# Patient Record
Sex: FEMALE | Race: WHITE | NOT HISPANIC OR LATINO | URBAN - METROPOLITAN AREA
[De-identification: names, ages, dates, MRNs, and addresses within clinical notes are randomized per-mention and may not be internally consistent; named-entity substitution may affect disease eponyms.]

---

## 2022-07-09 ENCOUNTER — TELEPHONE ENCOUNTER (OUTPATIENT)
Dept: URBAN - METROPOLITAN AREA CLINIC 121 | Facility: CLINIC | Age: 77
End: 2022-07-09

## 2022-07-09 RX ORDER — LEFLUNOMIDE 20 MG/1
TABLET, FILM COATED ORAL
Refills: 0 | OUTPATIENT
Start: 2014-05-13 | End: 2014-05-20

## 2022-07-09 RX ORDER — AMITRIPTYLINE HYDROCHLORIDE 10 MG/1
TABLET, FILM COATED ORAL
Refills: 0 | OUTPATIENT
Start: 2014-05-13 | End: 2014-05-20

## 2022-07-09 RX ORDER — TRAMADOL HYDROCHLORIDE 50 MG/1
TABLET ORAL
Refills: 0 | OUTPATIENT
Start: 2014-05-13 | End: 2014-05-20

## 2022-07-09 RX ORDER — TIZANIDINE 4 MG/1
TABLET ORAL
Refills: 0 | OUTPATIENT
Start: 2014-05-14 | End: 2014-05-20

## 2022-07-09 RX ORDER — OMEGA-3S/DHA/EPA/FISH OIL 980-1400MG
CAPSULE,DELAYED RELEASE (ENTERIC COATED) ORAL
Refills: 0 | OUTPATIENT
Start: 2014-05-20 | End: 2014-12-09

## 2022-07-10 ENCOUNTER — TELEPHONE ENCOUNTER (OUTPATIENT)
Dept: URBAN - METROPOLITAN AREA CLINIC 121 | Facility: CLINIC | Age: 77
End: 2022-07-10

## 2022-07-10 RX ORDER — FLAXSEED OIL 1000 MG
CAPSULE ORAL
Refills: 0 | Status: ACTIVE | COMMUNITY
Start: 2014-05-20

## 2022-07-10 RX ORDER — DEXTROMETHORPHAN POLISTIREX 30 MG/5 ML
SUSPENSION, EXTENDED RELEASE 12 HR ORAL
Refills: 0 | Status: ACTIVE | COMMUNITY
Start: 2014-05-20

## 2022-07-10 RX ORDER — HYDROXYCHLOROQUINE SULFATE 200 MG/1
TABLET, FILM COATED ORAL ONCE A DAY
Refills: 0 | Status: ACTIVE | COMMUNITY
Start: 2014-06-17

## 2022-07-10 RX ORDER — AMITRIPTYLINE HYDROCHLORIDE 10 MG/1
TABLET, FILM COATED ORAL
Refills: 0 | Status: ACTIVE | COMMUNITY
Start: 2014-05-20

## 2022-07-10 RX ORDER — TRAMADOL HYDROCHLORIDE 50 MG/1
TABLET ORAL
Refills: 0 | Status: ACTIVE | COMMUNITY
Start: 2014-05-20

## 2022-07-10 RX ORDER — LEFLUNOMIDE 20 MG/1
TABLET, FILM COATED ORAL
Refills: 0 | Status: ACTIVE | COMMUNITY
Start: 2014-05-20

## 2022-07-10 RX ORDER — SACCHAROMYCES BOULARDII 250 MG
CAPSULE ORAL ONCE A DAY
Refills: 0 | Status: ACTIVE | COMMUNITY
Start: 2014-05-20

## 2022-07-10 RX ORDER — TIZANIDINE 4 MG/1
TABLET ORAL
Refills: 0 | Status: ACTIVE | COMMUNITY
Start: 2014-05-20

## 2023-01-10 ENCOUNTER — WEB ENCOUNTER (OUTPATIENT)
Dept: URBAN - METROPOLITAN AREA CLINIC 60 | Facility: CLINIC | Age: 78
End: 2023-01-10

## 2023-01-10 ENCOUNTER — LAB OUTSIDE AN ENCOUNTER (OUTPATIENT)
Dept: URBAN - METROPOLITAN AREA CLINIC 60 | Facility: CLINIC | Age: 78
End: 2023-01-10

## 2023-01-10 ENCOUNTER — OFFICE VISIT (OUTPATIENT)
Dept: URBAN - METROPOLITAN AREA CLINIC 60 | Facility: CLINIC | Age: 78
End: 2023-01-10
Payer: MEDICARE

## 2023-01-10 VITALS
BODY MASS INDEX: 41.82 KG/M2 | TEMPERATURE: 98 F | SYSTOLIC BLOOD PRESSURE: 128 MMHG | OXYGEN SATURATION: 96 % | WEIGHT: 251 LBS | HEIGHT: 65 IN | HEART RATE: 72 BPM | DIASTOLIC BLOOD PRESSURE: 68 MMHG

## 2023-01-10 DIAGNOSIS — D75.89 MACROCYTOSIS WITHOUT ANEMIA: ICD-10-CM

## 2023-01-10 DIAGNOSIS — Z12.11 SCREEN FOR COLON CANCER: ICD-10-CM

## 2023-01-10 DIAGNOSIS — K92.2 GI BLEED: ICD-10-CM

## 2023-01-10 DIAGNOSIS — D72.829 LEUKOCYTOSIS, UNSPECIFIED: ICD-10-CM

## 2023-01-10 DIAGNOSIS — R13.10 DYSPHAGIA: ICD-10-CM

## 2023-01-10 DIAGNOSIS — D47.3 THROMBOCYTHEMIA: ICD-10-CM

## 2023-01-10 DIAGNOSIS — R10.32 LLQ ABDOMINAL PAIN: ICD-10-CM

## 2023-01-10 PROCEDURE — 99205 OFFICE O/P NEW HI 60 MIN: CPT | Performed by: INTERNAL MEDICINE

## 2023-01-10 RX ORDER — ENALAPRIL MALEATE 5 MG/1
TAKE ONE TABLET BY MOUTH ONE TIME DAILY TABLET ORAL
Qty: 90 UNSPECIFIED | Refills: 0 | Status: ACTIVE | COMMUNITY

## 2023-01-10 RX ORDER — HYDROXYUREA 500 MG/1
CAPSULE ORAL
Qty: 73 EACH | Refills: 0 | Status: ACTIVE | COMMUNITY

## 2023-01-10 RX ORDER — GABAPENTIN 600 MG/1
TAKE ONE TABLET BY MOUTH THREE TIMES A DAY TABLET, FILM COATED ORAL
Qty: 90 UNSPECIFIED | Refills: 1 | Status: ACTIVE | COMMUNITY

## 2023-01-10 RX ORDER — TIZANIDINE 4 MG/1
TABLET ORAL
Refills: 0 | Status: ACTIVE | COMMUNITY
Start: 2014-05-20

## 2023-01-10 RX ORDER — TRAMADOL HYDROCHLORIDE 50 MG/1
TABLET ORAL
Refills: 0 | Status: ACTIVE | COMMUNITY
Start: 2014-05-20

## 2023-01-10 RX ORDER — DEXTROMETHORPHAN POLISTIREX 30 MG/5 ML
SUSPENSION, EXTENDED RELEASE 12 HR ORAL
Refills: 0 | Status: ACTIVE | COMMUNITY
Start: 2014-05-20

## 2023-01-10 RX ORDER — WARFARIN SODIUM 7.5 MG/1
TABLET ORAL
Qty: 20 EACH | Refills: 0 | Status: ACTIVE | COMMUNITY

## 2023-01-10 RX ORDER — AMITRIPTYLINE HYDROCHLORIDE 10 MG/1
TABLET, FILM COATED ORAL
Refills: 0 | Status: ACTIVE | COMMUNITY
Start: 2014-05-20

## 2023-01-10 NOTE — HPI-TODAY'S VISIT:
Kaylee is a pleasant 77-year-old white female who is being seen in the office after being recently admitted to the hospital for abdominal pain and bleeding. 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 Reviewed her records extensively on [facility].  She was last seen in the office in 2014.  She underwent a colonoscopy with Dr. Johnson for chronic diarrhea and got diagnosed with collagenous colitis.  She was at the time briefly placed on budesonide.  She was referred to me after this diagnosis was made.  She was scheduled to undergo an upper endoscopy in 2014 for symptoms of dysphagia but she canceled Admitted to the hospital with lower abdominal pain and rectal bleeding.  Mesenteric ischemia was suspected and the CT angiogram was obtained which noted patent mesenteric vessels.  Evaluated by GI who felt that the bleeding was likely secondary to hemorrhoids. Hemoglobin remained stable.  She did not require transfusions.  Labs noted leukocytosis, thrombocytosis and macrocytosis.  She sees Dr. Gibbons for thrombocythemia and is on hydroxyurea. At this time she reports her symptoms have significantly improved.  Her left lower quadrant pain still persists.  Imaging studies did not reveal any diverticulitis.  Bleeding has subsided.  Reports intermittent nonprogressive dysphagia.  She has to eat slowly and drink sips of water with every 3 bites.  Denies any choking or gagging or regurgitation.  No weight loss.  Denies any symptoms of early satiety or bloating.  Has 2 formed stools per day.  Rare bleeding. She is on Coumadin for pulmonary embolism x2-most recent one apparently was in 2021.-Saddle pulmonary embolism..

## 2023-01-10 NOTE — HPI-PREVIOUS IMAGING
CT angiogram of the abdomen and pelvis: December 2022: Celiac trunk, splenic and hepatic arteries are widely patent superior mesenteric artery, widely patent inferior mesenteric artery patent, normal liver, normal gallbladder, normal pancreas, no abdominal adenopathy. Ultrasound March 2021: Mild hepatomegaly consistent with slight fatty infiltration, normal gallbladder without stones

## 2023-01-10 NOTE — HPI-PREVIOUS LABS
Labs December 23, 2022: WBC 16.9, hemoglobin 13.7 g, , platelets 486, prothrombin time 24.7 INR 2.21, normal liver enzymes, normal renal function, normal electrolytes, urinalysis noted glucose, small amount of blood,

## 2023-01-10 NOTE — HPI-PREVIOUS PROCEDURES
Colonoscopy 2014-Dr. Jadon Crowder No colon polyps.  No colitis.  Random biopsies noted collagenous colitis

## 2023-01-11 ENCOUNTER — TELEPHONE ENCOUNTER (OUTPATIENT)
Dept: URBAN - METROPOLITAN AREA CLINIC 60 | Facility: CLINIC | Age: 78
End: 2023-01-11

## 2023-01-31 ENCOUNTER — OFFICE VISIT (OUTPATIENT)
Dept: URBAN - METROPOLITAN AREA CLINIC 60 | Facility: CLINIC | Age: 78
End: 2023-01-31

## 2023-02-14 ENCOUNTER — TELEPHONE ENCOUNTER (OUTPATIENT)
Dept: URBAN - METROPOLITAN AREA CLINIC 63 | Facility: CLINIC | Age: 78
End: 2023-02-14

## 2023-02-14 ENCOUNTER — OFFICE VISIT (OUTPATIENT)
Dept: URBAN - METROPOLITAN AREA CLINIC 60 | Facility: CLINIC | Age: 78
End: 2023-02-14

## 2023-02-14 PROBLEM — 6631009 THROMBOCYTHEMIA: Status: ACTIVE | Noted: 2023-01-10

## 2023-02-14 PROBLEM — 40739000 DYSPHAGIA: Status: ACTIVE | Noted: 2023-01-10

## 2023-02-14 PROBLEM — 234339009 MACROCYTOSIS - NO ANEMIA: Status: ACTIVE | Noted: 2023-01-10

## 2023-02-14 PROBLEM — 111583006 LEUKOCYTOSIS: Status: ACTIVE | Noted: 2023-01-10

## 2023-02-14 RX ORDER — WARFARIN SODIUM 7.5 MG/1
TABLET ORAL
Qty: 20 EACH | Refills: 0 | Status: ACTIVE | COMMUNITY

## 2023-02-14 RX ORDER — ENALAPRIL MALEATE 5 MG/1
TAKE ONE TABLET BY MOUTH ONE TIME DAILY TABLET ORAL
Qty: 90 UNSPECIFIED | Refills: 0 | Status: ACTIVE | COMMUNITY

## 2023-02-14 RX ORDER — TIZANIDINE 4 MG/1
TABLET ORAL
Refills: 0 | Status: ACTIVE | COMMUNITY
Start: 2014-05-20

## 2023-02-14 RX ORDER — AMITRIPTYLINE HYDROCHLORIDE 10 MG/1
TABLET, FILM COATED ORAL
Refills: 0 | Status: ACTIVE | COMMUNITY
Start: 2014-05-20

## 2023-02-14 RX ORDER — TRAMADOL HYDROCHLORIDE 50 MG/1
TABLET ORAL
Refills: 0 | Status: ACTIVE | COMMUNITY
Start: 2014-05-20

## 2023-02-14 RX ORDER — DEXTROMETHORPHAN POLISTIREX 30 MG/5 ML
SUSPENSION, EXTENDED RELEASE 12 HR ORAL
Refills: 0 | Status: ACTIVE | COMMUNITY
Start: 2014-05-20

## 2023-02-14 RX ORDER — GABAPENTIN 600 MG/1
TAKE ONE TABLET BY MOUTH THREE TIMES A DAY TABLET, FILM COATED ORAL
Qty: 90 UNSPECIFIED | Refills: 1 | Status: ACTIVE | COMMUNITY

## 2023-02-14 RX ORDER — HYDROXYUREA 500 MG/1
CAPSULE ORAL
Qty: 73 EACH | Refills: 0 | Status: ACTIVE | COMMUNITY

## 2023-02-14 NOTE — HPI-TODAY'S VISIT:
Kaylee is a pleasant 77-year-old white female who is being seen in the office after being recently admitted to the hospital for abdominal pain and bleeding. 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 Reviewed her records extensively on [facility].  She was last seen in the office in 2014.  She underwent a colonoscopy with Dr. Johnson for chronic diarrhea and got diagnosed with collagenous colitis.  She was at the time briefly placed on budesonide.  She was referred to me after this diagnosis was made.  She was scheduled to undergo an upper endoscopy in 2014 for symptoms of dysphagia but she canceled Admitted to the hospital with lower abdominal pain and rectal bleeding.  Mesenteric ischemia was suspected and the CT angiogram was obtained which noted patent mesenteric vessels.  Evaluated by GI who felt that the bleeding was likely secondary to hemorrhoids. Hemoglobin remained stable.  She did not require transfusions.  Labs noted leukocytosis, thrombocytosis and macrocytosis.  She sees Dr. Gibbons for thrombocythemia and is on hydroxyurea. At this time she reports her symptoms have significantly improved.  Her left lower quadrant pain still persists.  Imaging studies did not reveal any diverticulitis.  Bleeding has subsided.  Reports intermittent nonprogressive dysphagia.  She has to eat slowly and drink sips of water with every 3 bites.  Denies any choking or gagging or regurgitation.  No weight loss.  Denies any symptoms of early satiety or bloating.  Has 2 formed stools per day.  Rare bleeding. She is on Coumadin for pulmonary embolism x2-most recent one apparently was in 2021.-Saddle pulmonary embolism..

## 2023-02-15 ENCOUNTER — TELEPHONE ENCOUNTER (OUTPATIENT)
Dept: URBAN - METROPOLITAN AREA CLINIC 63 | Facility: CLINIC | Age: 78
End: 2023-02-15

## 2023-02-20 ENCOUNTER — TELEPHONE ENCOUNTER (OUTPATIENT)
Dept: URBAN - METROPOLITAN AREA CLINIC 23 | Facility: CLINIC | Age: 78
End: 2023-02-20

## 2023-02-28 ENCOUNTER — OFFICE VISIT (OUTPATIENT)
Dept: URBAN - METROPOLITAN AREA CLINIC 60 | Facility: CLINIC | Age: 78
End: 2023-02-28

## 2023-03-30 ENCOUNTER — OFFICE VISIT (OUTPATIENT)
Dept: URBAN - METROPOLITAN AREA CLINIC 63 | Facility: CLINIC | Age: 78
End: 2023-03-30

## 2023-03-30 RX ORDER — GABAPENTIN 600 MG/1
TAKE ONE TABLET BY MOUTH THREE TIMES A DAY TABLET, FILM COATED ORAL
Qty: 90 UNSPECIFIED | Refills: 1 | Status: ACTIVE | COMMUNITY

## 2023-03-30 RX ORDER — DEXTROMETHORPHAN POLISTIREX 30 MG/5 ML
SUSPENSION, EXTENDED RELEASE 12 HR ORAL
Refills: 0 | Status: ACTIVE | COMMUNITY
Start: 2014-05-20

## 2023-03-30 RX ORDER — WARFARIN SODIUM 7.5 MG/1
TABLET ORAL
Qty: 20 EACH | Refills: 0 | Status: ACTIVE | COMMUNITY

## 2023-03-30 RX ORDER — AMITRIPTYLINE HYDROCHLORIDE 10 MG/1
TABLET, FILM COATED ORAL
Refills: 0 | Status: ACTIVE | COMMUNITY
Start: 2014-05-20

## 2023-03-30 RX ORDER — TIZANIDINE 4 MG/1
TABLET ORAL
Refills: 0 | Status: ACTIVE | COMMUNITY
Start: 2014-05-20

## 2023-03-30 RX ORDER — TRAMADOL HYDROCHLORIDE 50 MG/1
TABLET ORAL
Refills: 0 | Status: ACTIVE | COMMUNITY
Start: 2014-05-20

## 2023-03-30 RX ORDER — HYDROXYUREA 500 MG/1
CAPSULE ORAL
Qty: 73 EACH | Refills: 0 | Status: ACTIVE | COMMUNITY

## 2023-03-30 RX ORDER — ENALAPRIL MALEATE 5 MG/1
TAKE ONE TABLET BY MOUTH ONE TIME DAILY TABLET ORAL
Qty: 90 UNSPECIFIED | Refills: 0 | Status: ACTIVE | COMMUNITY

## 2023-03-30 NOTE — HPI-PREVIOUS IMAGING
Barium swallow March 2023: Mild prominence of tertiary contractions at the mid to distal thoracic esophagus compatible with mild presbyesophagus.  No evidence of stricture.  An episode of esophageal spasm occurred in the distal thoracic esophagus which delayed the passage of a 13 mm barium tablet.  No hiatal hernia CT angiogram of the abdomen and pelvis: December 2022: Celiac trunk, splenic and hepatic arteries are widely patent superior mesenteric artery, widely patent inferior mesenteric artery patent, normal liver, normal gallbladder, normal pancreas, no abdominal adenopathy. Ultrasound March 2021: Mild hepatomegaly consistent with slight fatty infiltration, normal gallbladder without stones

## 2023-05-16 ENCOUNTER — APPOINTMENT (RX ONLY)
Dept: URBAN - METROPOLITAN AREA CLINIC 333 | Facility: CLINIC | Age: 78
Setting detail: DERMATOLOGY
End: 2023-05-16

## 2023-05-16 DIAGNOSIS — L01.01 NON-BULLOUS IMPETIGO: ICD-10-CM | Status: INADEQUATELY CONTROLLED

## 2023-05-16 DIAGNOSIS — Z12.83 ENCOUNTER FOR SCREENING FOR MALIGNANT NEOPLASM OF SKIN: ICD-10-CM

## 2023-05-16 DIAGNOSIS — D18.0 HEMANGIOMA: ICD-10-CM | Status: STABLE

## 2023-05-16 DIAGNOSIS — L57.0 ACTINIC KERATOSIS: ICD-10-CM | Status: INADEQUATELY CONTROLLED

## 2023-05-16 DIAGNOSIS — L28.1 PRURIGO NODULARIS: ICD-10-CM | Status: INADEQUATELY CONTROLLED

## 2023-05-16 DIAGNOSIS — Z85.828 PERSONAL HISTORY OF OTHER MALIGNANT NEOPLASM OF SKIN: ICD-10-CM

## 2023-05-16 DIAGNOSIS — L82.1 OTHER SEBORRHEIC KERATOSIS: ICD-10-CM

## 2023-05-16 DIAGNOSIS — L57.8 OTHER SKIN CHANGES DUE TO CHRONIC EXPOSURE TO NONIONIZING RADIATION: ICD-10-CM

## 2023-05-16 DIAGNOSIS — D22 MELANOCYTIC NEVI: ICD-10-CM

## 2023-05-16 DIAGNOSIS — L81.4 OTHER MELANIN HYPERPIGMENTATION: ICD-10-CM

## 2023-05-16 PROBLEM — D18.01 HEMANGIOMA OF SKIN AND SUBCUTANEOUS TISSUE: Status: ACTIVE | Noted: 2023-05-16

## 2023-05-16 PROBLEM — D22.5 MELANOCYTIC NEVI OF TRUNK: Status: ACTIVE | Noted: 2023-05-16

## 2023-05-16 PROCEDURE — ? LIQUID NITROGEN

## 2023-05-16 PROCEDURE — ? COUNSELING

## 2023-05-16 PROCEDURE — ? FULL BODY SKIN EXAM

## 2023-05-16 PROCEDURE — ? PRESCRIPTION

## 2023-05-16 PROCEDURE — ? TREATMENT REGIMEN

## 2023-05-16 PROCEDURE — 99213 OFFICE O/P EST LOW 20 MIN: CPT | Mod: 25

## 2023-05-16 PROCEDURE — 17000 DESTRUCT PREMALG LESION: CPT

## 2023-05-16 RX ORDER — MINOCYCLINE HYDROCHLORIDE 100 MG/1
CAPSULE ORAL BID
Qty: 20 | Refills: 0 | Status: ERX | COMMUNITY
Start: 2023-05-16

## 2023-05-16 RX ORDER — GENTAMICIN SULFATE 1 MG/G
OINTMENT TOPICAL
Qty: 30 | Refills: 4 | Status: ERX | COMMUNITY
Start: 2023-05-16

## 2023-05-16 RX ORDER — MUPIROCIN 20 MG/G
OINTMENT TOPICAL
Qty: 22 | Refills: 4 | Status: ERX | COMMUNITY
Start: 2023-05-16

## 2023-05-16 RX ADMIN — MUPIROCIN 1: 20 OINTMENT TOPICAL at 00:00

## 2023-05-16 RX ADMIN — MINOCYCLINE HYDROCHLORIDE 1: 100 CAPSULE ORAL at 00:00

## 2023-05-16 RX ADMIN — GENTAMICIN SULFATE 1: 1 OINTMENT TOPICAL at 00:00

## 2023-05-16 ASSESSMENT — LOCATION SIMPLE DESCRIPTION DERM
LOCATION SIMPLE: UPPER BACK
LOCATION SIMPLE: LEFT FOREARM
LOCATION SIMPLE: NOSE
LOCATION SIMPLE: RIGHT FOREARM
LOCATION SIMPLE: CHEST
LOCATION SIMPLE: RIGHT UPPER BACK
LOCATION SIMPLE: ABDOMEN
LOCATION SIMPLE: LEFT LIP
LOCATION SIMPLE: RIGHT PRETIBIAL REGION
LOCATION SIMPLE: RIGHT CALF

## 2023-05-16 ASSESSMENT — LOCATION DETAILED DESCRIPTION DERM
LOCATION DETAILED: RIGHT VENTRAL PROXIMAL FOREARM
LOCATION DETAILED: RIGHT SUPERIOR MEDIAL UPPER BACK
LOCATION DETAILED: MIDDLE STERNUM
LOCATION DETAILED: RIGHT PROXIMAL PRETIBIAL REGION
LOCATION DETAILED: NASAL DORSUM
LOCATION DETAILED: INFERIOR THORACIC SPINE
LOCATION DETAILED: LEFT VENTRAL PROXIMAL FOREARM
LOCATION DETAILED: EPIGASTRIC SKIN
LOCATION DETAILED: LEFT LOWER CUTANEOUS LIP
LOCATION DETAILED: LOWER STERNUM
LOCATION DETAILED: RIGHT DISTAL CALF

## 2023-05-16 ASSESSMENT — LOCATION ZONE DERM
LOCATION ZONE: NOSE
LOCATION ZONE: TRUNK
LOCATION ZONE: ARM
LOCATION ZONE: LIP
LOCATION ZONE: LEG

## 2023-05-16 NOTE — PROCEDURE: LIQUID NITROGEN
Number Of Freeze-Thaw Cycles: 1 freeze-thaw cycle
Render Post-Care Instructions In Note?: no
Render Note In Bullet Format When Appropriate: Yes
Consent: The patient's verbal consent was obtained including but not limited to risks of crusting, scabbing, blistering, scarring, darker or lighter pigmentary change, recurrence, incomplete removal and infection.
Post-Care Instructions: Aftercare Cryosurgery\\nYou have just had cryotherapy for the treatment of a pre-cancerous or other benign (non-cancerous) skin lesions. You can expect the pain to rapidly decrease over the next 45 minutes. The area treated will initially turn red and over the next 72 hours turn brown to black. A scab will form that will peel off by itself within 5 to 7 days. A blister or reddish purple blood blister may form where the area has been treated. When the scab forms, you can apply Vaseline or Scar Recovery Gel twice a day to the wound. This product will improve the healing of the wound, decreasing the appearance of red or pink pigment changes in the wound. The gel has also been shown to significantly decrease itching while the wound heals. You can purchase the Scar Recovery Gel at our office.\\nCan I wash or use makeup? Yes\\nShould I break the blister should one form?\\nIf the blister is bothersome, you may break the blister with a clean needle if the lesion is on the body. However, we do not recommend doing this for lesions on the face. Keep the area dry and as clean as possible in order to prevent infection. Natural skin is the best protection to prevent infection.\\nWill this leave a scar?\\nIt could, but it is very unlikely. However, it may leave a slight discoloration, which should be temporary.\\nWhat if the skin growth doesn't go away after this has healed?\\nThe providers treated this area believing it did not have cancerous roots and was strictly limited to the surface of the skin as a pre-cancerous or benign growth would be. The growth may not disappear or it may return over a period of time. You need to have this area checked again at your follow-up appointment to ensure that it does not need further treatment or that it has resolved.
Detail Level: Detailed
Duration Of Freeze Thaw-Cycle (Seconds): 7

## 2023-05-16 NOTE — HPI: EVALUATION OF SKIN LESION(S)
What Type Of Note Output Would You Prefer (Optional)?: Bullet Format
Hpi Title: Evaluation of Skin Lesions
Additional History: \\n\\nPlease also check possible sores on both legs. Present for month. Itchy, painful, draining, yellow.

## 2023-05-16 NOTE — PROCEDURE: COUNSELING
Detail Level: Generalized
Detail Level: Detailed
Patient Specific Counseling (Will Not Stick From Patient To Patient): ****\\nPt with history of this and previously did LB. I advised pt once her right leg infection is improved, she should consider restarting LB again.
Detail Level: Zone
Patient Specific Counseling (Will Not Stick From Patient To Patient): ***\\nCounseled pt that she has several open ulcers on her right leg, 2 that are very large. I advised her several times to not go swimming while these areas are open, as this may be contributing to delayed healing.\\n\\nAlso advised pt to keep areas covered, as they are actively weeping and she did not come in with bandages. Discussed using gauze wrap and ace bandage.

## 2023-05-23 ENCOUNTER — OFFICE VISIT (OUTPATIENT)
Dept: URBAN - METROPOLITAN AREA CLINIC 60 | Facility: CLINIC | Age: 78
End: 2023-05-23

## 2023-05-23 RX ORDER — TRAMADOL HYDROCHLORIDE 50 MG/1
TABLET ORAL
Refills: 0 | Status: ACTIVE | COMMUNITY
Start: 2014-05-20

## 2023-05-23 RX ORDER — TIZANIDINE 4 MG/1
TABLET ORAL
Refills: 0 | Status: ACTIVE | COMMUNITY
Start: 2014-05-20

## 2023-05-23 RX ORDER — ENALAPRIL MALEATE 5 MG/1
TAKE ONE TABLET BY MOUTH ONE TIME DAILY TABLET ORAL
Qty: 90 UNSPECIFIED | Refills: 0 | Status: ACTIVE | COMMUNITY

## 2023-05-23 RX ORDER — HYDROXYUREA 500 MG/1
CAPSULE ORAL
Qty: 73 EACH | Refills: 0 | Status: ACTIVE | COMMUNITY

## 2023-05-23 RX ORDER — WARFARIN SODIUM 7.5 MG/1
TABLET ORAL
Qty: 20 EACH | Refills: 0 | Status: ACTIVE | COMMUNITY

## 2023-05-23 RX ORDER — DEXTROMETHORPHAN POLISTIREX 30 MG/5 ML
SUSPENSION, EXTENDED RELEASE 12 HR ORAL
Refills: 0 | Status: ACTIVE | COMMUNITY
Start: 2014-05-20

## 2023-05-23 RX ORDER — GABAPENTIN 600 MG/1
TAKE ONE TABLET BY MOUTH THREE TIMES A DAY TABLET, FILM COATED ORAL
Qty: 90 UNSPECIFIED | Refills: 1 | Status: ACTIVE | COMMUNITY

## 2023-05-23 RX ORDER — AMITRIPTYLINE HYDROCHLORIDE 10 MG/1
TABLET, FILM COATED ORAL
Refills: 0 | Status: ACTIVE | COMMUNITY
Start: 2014-05-20

## 2023-05-23 NOTE — HPI-TODAY'S VISIT:
Kaylee is a pleasant 77-year-old white female who is being seen in the office after being recently admitted to the hospital for abdominal pain and bleeding. 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 Reviewed her records extensively on [facility].  She was last seen in the office in 2014.  She underwent a colonoscopy with Dr. Johnson for chronic diarrhea and got diagnosed with collagenous colitis.  She was at the time briefly placed on budesonide.  She was referred to me after this diagnosis was made.  She was scheduled to undergo an upper endoscopy in 2014 for symptoms of dysphagia but she canceled Admitted to the hospital with lower abdominal pain and rectal bleeding.  Mesenteric ischemia was suspected and the CT angiogram was obtained which noted patent mesenteric vessels.  Evaluated by GI who felt that the bleeding was likely secondary to hemorrhoids. Hemoglobin remained stable.  She did not require transfusions.  Labs noted leukocytosis, thrombocytosis and macrocytosis.  She sees Dr. Gibbons for thrombocythemia and is on hydroxyurea. At this time she reports her symptoms have significantly improved.  Her left lower quadrant pain still persists.  Imaging studies did not reveal any diverticulitis.  Bleeding has subsided.  Reports intermittent nonprogressive dysphagia.  She has to eat slowly and drink sips of water with every 3 bites.  Denies any choking or gagging or regurgitation.  No weight loss.  Denies any symptoms of early satiety or bloating.  Has 2 formed stools per day.  Rare bleeding. She is on Coumadin for pulmonary embolism x2-most recent one apparently was in 2021.-Saddle pulmonary embolism..

## 2023-05-23 NOTE — HPI-PREVIOUS LABS
Prothrombin time 37.7, INR 4.1-February 2023 Labs May 2023: Prothrombin time 43.6 INR 4.2 BUN 32, creatinine 1.8, sodium 142 potassium 5.3, normal liver enzymes, WBC 7.3, hemoglobin 12.9 g  (H) platelets 524 (H).  Labs December 23, 2022: WBC 16.9, hemoglobin 13.7 g, , platelets 486, prothrombin time 24.7 INR 2.21, normal liver enzymes, normal renal function, normal electrolytes, urinalysis noted glucose, small amount of blood,

## 2023-05-23 NOTE — HPI-PREVIOUS IMAGING
Barium swallow march 2023: Mild prominence of tertiary contractions in the mid to distal thoracic esophagus compatible with mild presbyesophagus.  No evidence of high-grade stricture.  An episode of esophageal spasm occurred in the distal thoracic esophagus which delayed passage of a 13 mm barium tablet.  No hiatal hernia  CT angiogram of the abdomen and pelvis: December 2022: Celiac trunk, splenic and hepatic arteries are widely patent superior mesenteric artery, widely patent inferior mesenteric artery patent, normal liver, normal gallbladder, normal pancreas, no abdominal adenopathy. Ultrasound March 2021: Mild hepatomegaly consistent with slight fatty infiltration, normal gallbladder without stones

## 2023-05-31 ENCOUNTER — APPOINTMENT (RX ONLY)
Dept: URBAN - METROPOLITAN AREA CLINIC 333 | Facility: CLINIC | Age: 78
Setting detail: DERMATOLOGY
End: 2023-05-31

## 2023-05-31 DIAGNOSIS — L01.01 NON-BULLOUS IMPETIGO: ICD-10-CM | Status: IMPROVED

## 2023-05-31 PROCEDURE — 99213 OFFICE O/P EST LOW 20 MIN: CPT

## 2023-05-31 PROCEDURE — ? PRESCRIPTION MEDICATION MANAGEMENT

## 2023-05-31 PROCEDURE — ? COUNSELING

## 2023-05-31 ASSESSMENT — LOCATION ZONE DERM: LOCATION ZONE: LEG

## 2023-05-31 ASSESSMENT — LOCATION DETAILED DESCRIPTION DERM
LOCATION DETAILED: LEFT PROXIMAL CALF
LOCATION DETAILED: RIGHT PROXIMAL PRETIBIAL REGION
LOCATION DETAILED: RIGHT DISTAL CALF
LOCATION DETAILED: LEFT PROXIMAL PRETIBIAL REGION

## 2023-05-31 ASSESSMENT — LOCATION SIMPLE DESCRIPTION DERM
LOCATION SIMPLE: LEFT PRETIBIAL REGION
LOCATION SIMPLE: LEFT CALF
LOCATION SIMPLE: RIGHT PRETIBIAL REGION
LOCATION SIMPLE: RIGHT CALF

## 2023-05-31 NOTE — PROCEDURE: PRESCRIPTION MEDICATION MANAGEMENT
Detail Level: Zone
Continue Regimen: Gentamicin twice a day on legs\\nMupirocin twice a day on open wounds
Discontinue Regimen: Minocycline
Render In Strict Bullet Format?: No

## 2023-06-06 ENCOUNTER — OFFICE VISIT (OUTPATIENT)
Dept: URBAN - METROPOLITAN AREA CLINIC 60 | Facility: CLINIC | Age: 78
End: 2023-06-06

## 2023-09-05 ENCOUNTER — OFFICE VISIT (OUTPATIENT)
Dept: URBAN - METROPOLITAN AREA CLINIC 60 | Facility: CLINIC | Age: 78
End: 2023-09-05

## 2023-09-21 ENCOUNTER — OFFICE VISIT (OUTPATIENT)
Dept: URBAN - METROPOLITAN AREA CLINIC 60 | Facility: CLINIC | Age: 78
End: 2023-09-21
Payer: MEDICARE

## 2023-09-21 ENCOUNTER — DASHBOARD ENCOUNTERS (OUTPATIENT)
Age: 78
End: 2023-09-21

## 2023-09-21 VITALS
WEIGHT: 253.4 LBS | HEIGHT: 65 IN | TEMPERATURE: 98.2 F | OXYGEN SATURATION: 90 % | DIASTOLIC BLOOD PRESSURE: 76 MMHG | SYSTOLIC BLOOD PRESSURE: 126 MMHG | BODY MASS INDEX: 42.22 KG/M2 | HEART RATE: 90 BPM

## 2023-09-21 DIAGNOSIS — K22.89 PRESBYESOPHAGUS: ICD-10-CM

## 2023-09-21 DIAGNOSIS — K22.4 ESOPHAGEAL SPASM: ICD-10-CM

## 2023-09-21 DIAGNOSIS — I25.10 CORONARY ARTERY DISEASE INVOLVING NATIVE CORONARY ARTERY OF NATIVE HEART WITHOUT ANGINA PECTORIS: ICD-10-CM

## 2023-09-21 PROBLEM — 266434009: Status: ACTIVE | Noted: 2023-09-21

## 2023-09-21 PROCEDURE — 99213 OFFICE O/P EST LOW 20 MIN: CPT | Performed by: NURSE PRACTITIONER

## 2023-09-21 RX ORDER — WARFARIN SODIUM 7.5 MG/1
TABLET ORAL
Qty: 20 EACH | Refills: 0 | Status: ACTIVE | COMMUNITY

## 2023-09-21 RX ORDER — AMITRIPTYLINE HYDROCHLORIDE 10 MG/1
TABLET, FILM COATED ORAL
Refills: 0 | Status: ACTIVE | COMMUNITY
Start: 2014-05-20

## 2023-09-21 RX ORDER — TRAMADOL HYDROCHLORIDE 50 MG/1
TABLET ORAL
Refills: 0 | Status: ACTIVE | COMMUNITY
Start: 2014-05-20

## 2023-09-21 RX ORDER — DEXTROMETHORPHAN POLISTIREX 30 MG/5 ML
SUSPENSION, EXTENDED RELEASE 12 HR ORAL
Refills: 0 | Status: ACTIVE | COMMUNITY
Start: 2014-05-20

## 2023-09-21 RX ORDER — GABAPENTIN 600 MG/1
TAKE ONE TABLET BY MOUTH THREE TIMES A DAY TABLET, FILM COATED ORAL
Qty: 90 UNSPECIFIED | Refills: 1 | Status: ACTIVE | COMMUNITY

## 2023-09-21 RX ORDER — HYDROXYUREA 500 MG/1
CAPSULE ORAL
Qty: 73 EACH | Refills: 0 | Status: ACTIVE | COMMUNITY

## 2023-09-21 RX ORDER — ALLOPURINOL 100 MG/1
1 TABLET TABLET ORAL ONCE A DAY
Qty: 30 | Status: ACTIVE | COMMUNITY
Start: 2023-09-21 | End: 2023-10-21

## 2023-09-21 RX ORDER — FUROSEMIDE 40 MG/1
1 TABLET TABLET ORAL ONCE A DAY
Status: ACTIVE | COMMUNITY

## 2023-09-21 RX ORDER — TIZANIDINE 4 MG/1
TABLET ORAL
Refills: 0 | Status: ACTIVE | COMMUNITY
Start: 2014-05-20

## 2023-09-21 NOTE — PHYSICAL EXAM CONSTITUTIONAL:
well developed, well nourished , in no acute distress , ambulating with walker, normal communication ability

## 2023-09-21 NOTE — HPI-TODAY'S VISIT:
Here for follow up dysphagia Barium swallow in 03/2023 shows presybyesophagus and transient esophageal spasm, negative for mass or stricture. She is still having difficulty but is managing her symptoms well bu eating slowly, chewing carefully and washing down food with liquids between bites.  LAst colonoscopy in 2014 negative for polyps, no repeat needed she is having a coronary stent soon

## 2024-08-08 ENCOUNTER — APPOINTMENT (RX ONLY)
Dept: URBAN - METROPOLITAN AREA CLINIC 333 | Facility: CLINIC | Age: 79
Setting detail: DERMATOLOGY
End: 2024-08-08

## 2024-08-08 DIAGNOSIS — Z12.83 ENCOUNTER FOR SCREENING FOR MALIGNANT NEOPLASM OF SKIN: ICD-10-CM

## 2024-08-08 DIAGNOSIS — L28.1 PRURIGO NODULARIS: ICD-10-CM | Status: INADEQUATELY CONTROLLED

## 2024-08-08 DIAGNOSIS — L57.8 OTHER SKIN CHANGES DUE TO CHRONIC EXPOSURE TO NONIONIZING RADIATION: ICD-10-CM

## 2024-08-08 DIAGNOSIS — L81.4 OTHER MELANIN HYPERPIGMENTATION: ICD-10-CM

## 2024-08-08 DIAGNOSIS — D22 MELANOCYTIC NEVI: ICD-10-CM

## 2024-08-08 DIAGNOSIS — I87.2 VENOUS INSUFFICIENCY (CHRONIC) (PERIPHERAL): ICD-10-CM | Status: INADEQUATELY CONTROLLED

## 2024-08-08 DIAGNOSIS — Z87.2 PERSONAL HISTORY OF DISEASES OF THE SKIN AND SUBCUTANEOUS TISSUE: ICD-10-CM

## 2024-08-08 DIAGNOSIS — L82.1 OTHER SEBORRHEIC KERATOSIS: ICD-10-CM

## 2024-08-08 DIAGNOSIS — L29.89 OTHER PRURITUS: ICD-10-CM

## 2024-08-08 DIAGNOSIS — L29.8 OTHER PRURITUS: ICD-10-CM

## 2024-08-08 DIAGNOSIS — S0182XA OPEN WOUND OF OTHER AND MULTIPLE SITES OF FACE, COMPLICATED: ICD-10-CM

## 2024-08-08 PROBLEM — D22.5 MELANOCYTIC NEVI OF TRUNK: Status: ACTIVE | Noted: 2024-08-08

## 2024-08-08 PROBLEM — S41.101A UNSPECIFIED OPEN WOUND OF RIGHT UPPER ARM, INITIAL ENCOUNTER: Status: ACTIVE | Noted: 2024-08-08

## 2024-08-08 PROCEDURE — 99214 OFFICE O/P EST MOD 30 MIN: CPT

## 2024-08-08 PROCEDURE — ? FULL BODY SKIN EXAM

## 2024-08-08 PROCEDURE — ? COUNSELING

## 2024-08-08 PROCEDURE — ? TREATMENT REGIMEN

## 2024-08-08 PROCEDURE — ? PRESCRIPTION

## 2024-08-08 PROCEDURE — ? PRESCRIPTION MEDICATION MANAGEMENT

## 2024-08-08 RX ORDER — TRIAMCINOLONE ACETONIDE 1 MG/G
CREAM TOPICAL BID
Qty: 453.6 | Refills: 3 | Status: ERX | COMMUNITY
Start: 2024-08-08

## 2024-08-08 RX ADMIN — TRIAMCINOLONE ACETONIDE: 1 CREAM TOPICAL at 00:00

## 2024-08-08 ASSESSMENT — LOCATION DETAILED DESCRIPTION DERM
LOCATION DETAILED: LEFT LOWER CUTANEOUS LIP
LOCATION DETAILED: LEFT KNEE
LOCATION DETAILED: RIGHT SUPERIOR MEDIAL UPPER BACK
LOCATION DETAILED: LEFT SUPERIOR UPPER BACK
LOCATION DETAILED: RIGHT PROXIMAL PRETIBIAL REGION
LOCATION DETAILED: LEFT PROXIMAL PRETIBIAL REGION
LOCATION DETAILED: RIGHT DISTAL PRETIBIAL REGION
LOCATION DETAILED: RIGHT MEDIAL SUPERIOR CHEST
LOCATION DETAILED: RIGHT KNEE
LOCATION DETAILED: RIGHT ANTERIOR DISTAL UPPER ARM
LOCATION DETAILED: INFERIOR THORACIC SPINE

## 2024-08-08 ASSESSMENT — LOCATION ZONE DERM
LOCATION ZONE: TRUNK
LOCATION ZONE: ARM
LOCATION ZONE: LIP
LOCATION ZONE: LEG

## 2024-08-08 ASSESSMENT — LOCATION SIMPLE DESCRIPTION DERM
LOCATION SIMPLE: CHEST
LOCATION SIMPLE: RIGHT PRETIBIAL REGION
LOCATION SIMPLE: LEFT PRETIBIAL REGION
LOCATION SIMPLE: RIGHT KNEE
LOCATION SIMPLE: RIGHT UPPER BACK
LOCATION SIMPLE: UPPER BACK
LOCATION SIMPLE: LEFT UPPER BACK
LOCATION SIMPLE: LEFT LIP
LOCATION SIMPLE: LEFT KNEE
LOCATION SIMPLE: RIGHT UPPER ARM

## 2024-08-08 NOTE — PROCEDURE: PRESCRIPTION MEDICATION MANAGEMENT
Render In Strict Bullet Format?: No
Initiate Treatment: Triamcinolone: apply twice a day for two weeks to affected areas all over the body, then use for flare ups
Detail Level: Zone

## 2024-12-20 ENCOUNTER — APPOINTMENT (OUTPATIENT)
Dept: URBAN - METROPOLITAN AREA CLINIC 333 | Facility: CLINIC | Age: 79
Setting detail: DERMATOLOGY
End: 2024-12-20

## 2024-12-20 DIAGNOSIS — L98429 CHRONIC ULCER OF OTHER SPECIFIED SITES: ICD-10-CM | Status: INADEQUATELY CONTROLLED

## 2024-12-20 DIAGNOSIS — L98419 CHRONIC ULCER OF OTHER SPECIFIED SITES: ICD-10-CM | Status: INADEQUATELY CONTROLLED

## 2024-12-20 PROBLEM — L98.499 NON-PRESSURE CHRONIC ULCER OF SKIN OF OTHER SITES WITH UNSPECIFIED SEVERITY: Status: ACTIVE | Noted: 2024-12-20

## 2024-12-20 PROBLEM — L97.129 NON-PRESSURE CHRONIC ULCER OF LEFT THIGH WITH UNSPECIFIED SEVERITY: Status: ACTIVE | Noted: 2024-12-20

## 2024-12-20 PROCEDURE — ? PRESCRIPTION

## 2024-12-20 PROCEDURE — 99213 OFFICE O/P EST LOW 20 MIN: CPT

## 2024-12-20 PROCEDURE — ? COUNSELING

## 2024-12-20 RX ORDER — MUPIROCIN 20 MG/G
OINTMENT TOPICAL
Qty: 22 | Refills: 3 | Status: ERX

## 2024-12-20 ASSESSMENT — LOCATION DETAILED DESCRIPTION DERM
LOCATION DETAILED: RIGHT VENTRAL PROXIMAL FOREARM
LOCATION DETAILED: LEFT ANTERIOR PROXIMAL THIGH
LOCATION DETAILED: LEFT VENTRAL PROXIMAL FOREARM

## 2024-12-20 ASSESSMENT — LOCATION SIMPLE DESCRIPTION DERM
LOCATION SIMPLE: LEFT THIGH
LOCATION SIMPLE: RIGHT FOREARM
LOCATION SIMPLE: LEFT FOREARM

## 2024-12-20 ASSESSMENT — LOCATION ZONE DERM
LOCATION ZONE: LEG
LOCATION ZONE: ARM

## 2024-12-20 NOTE — HPI: SKIN LESION
Has Your Skin Lesion Been Treated?: not been treated
Is This A New Presentation, Or A Follow-Up?: Skin Lesion
Additional History: \\n\\nPatient presents possible ulcers on both arms and her right thigh